# Patient Record
Sex: FEMALE | Race: WHITE
[De-identification: names, ages, dates, MRNs, and addresses within clinical notes are randomized per-mention and may not be internally consistent; named-entity substitution may affect disease eponyms.]

---

## 2017-04-21 ENCOUNTER — HOSPITAL ENCOUNTER (INPATIENT)
Dept: HOSPITAL 53 - M ED | Age: 52
LOS: 12 days | Discharge: HOME | DRG: 751 | End: 2017-05-03
Admitting: PSYCHIATRY & NEUROLOGY
Payer: COMMERCIAL

## 2017-04-21 VITALS — HEIGHT: 70 IN | BODY MASS INDEX: 31.59 KG/M2 | WEIGHT: 220.68 LBS

## 2017-04-21 DIAGNOSIS — H54.8: ICD-10-CM

## 2017-04-21 DIAGNOSIS — F43.10: ICD-10-CM

## 2017-04-21 DIAGNOSIS — B39.9: ICD-10-CM

## 2017-04-21 DIAGNOSIS — G43.909: ICD-10-CM

## 2017-04-21 DIAGNOSIS — F33.2: Primary | ICD-10-CM

## 2017-04-21 DIAGNOSIS — Z79.899: ICD-10-CM

## 2017-04-21 LAB
ALBUMIN SERPL BCG-MCNC: 3.1 GM/DL (ref 3.2–5.2)
ALBUMIN/GLOB SERPL: 0.76 {RATIO} (ref 1–1.93)
ALP SERPL-CCNC: 104 U/L (ref 45–117)
ALT SERPL W P-5'-P-CCNC: 11 U/L (ref 12–78)
ANION GAP SERPL CALC-SCNC: 6 MEQ/L (ref 8–16)
AST SERPL-CCNC: 9 U/L (ref 15–37)
BILIRUB CONJ SERPL-MCNC: < 0.1 MG/DL (ref 0–0.2)
BILIRUB SERPL-MCNC: 0.3 MG/DL (ref 0.2–1)
BUN SERPL-MCNC: 11 MG/DL (ref 7–18)
CALCIUM SERPL-MCNC: 8.3 MG/DL (ref 8.5–10.1)
CHLORIDE SERPL-SCNC: 107 MEQ/L (ref 98–107)
CO2 SERPL-SCNC: 25 MEQ/L (ref 21–32)
CREAT SERPL-MCNC: 0.84 MG/DL (ref 0.55–1.02)
ERYTHROCYTE [DISTWIDTH] IN BLOOD BY AUTOMATED COUNT: 15.9 % (ref 11.5–14.5)
GFR SERPL CREATININE-BSD FRML MDRD: > 60 ML/MIN/{1.73_M2} (ref 51–?)
GLUCOSE SERPL-MCNC: 97 MG/DL (ref 70–105)
MCH RBC QN AUTO: 25.3 PG (ref 27–33)
MCHC RBC AUTO-ENTMCNC: 32 G/DL (ref 32–36.5)
MCV RBC AUTO: 78.9 FL (ref 80–96)
METHADONE UR QL SCN: NEGATIVE
PLATELET # BLD AUTO: 252 K/MM3 (ref 150–450)
POTASSIUM SERPL-SCNC: 4.1 MEQ/L (ref 3.5–5.1)
PROT SERPL-MCNC: 7.2 GM/DL (ref 6.4–8.2)
SODIUM SERPL-SCNC: 138 MEQ/L (ref 136–145)
WBC # BLD AUTO: 8.5 K/MM3 (ref 4–10)

## 2017-04-22 VITALS — SYSTOLIC BLOOD PRESSURE: 113 MMHG | DIASTOLIC BLOOD PRESSURE: 56 MMHG

## 2017-04-22 VITALS — DIASTOLIC BLOOD PRESSURE: 61 MMHG | SYSTOLIC BLOOD PRESSURE: 109 MMHG

## 2017-04-22 VITALS — SYSTOLIC BLOOD PRESSURE: 132 MMHG | DIASTOLIC BLOOD PRESSURE: 70 MMHG

## 2017-04-22 RX ADMIN — SERTRALINE HYDROCHLORIDE SCH MG: 25 TABLET ORAL at 09:58
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
## 2017-04-23 VITALS — SYSTOLIC BLOOD PRESSURE: 118 MMHG | DIASTOLIC BLOOD PRESSURE: 59 MMHG

## 2017-04-23 VITALS — DIASTOLIC BLOOD PRESSURE: 72 MMHG | SYSTOLIC BLOOD PRESSURE: 124 MMHG

## 2017-04-23 VITALS — SYSTOLIC BLOOD PRESSURE: 132 MMHG | DIASTOLIC BLOOD PRESSURE: 66 MMHG

## 2017-04-23 RX ADMIN — SERTRALINE HYDROCHLORIDE SCH MG: 25 TABLET ORAL at 08:23

## 2017-04-23 NOTE — HPE
DATE OF ADMISSION:  04/21/2017

 

Please refer to the psychiatric history and evaluation for further details on

this admission.  This examination and history is intended for medical issues

which may need treatment, followup, or consult on this 52-year-old female.

 

ALLERGIES:  No known allergies.

 

PRIMARY CARE PROVIDER:  She sees a Dr. Haynes at the [facility].

 

SOCIAL HISTORY:  She is .  She does not drink alcohol.  She does not

smoke cigarettes.  She does not use recreational drugs.

 

PAST MEDICAL HISTORY:

1.  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

2.  She has history of depression.

3.  She has history of migraines.

 

PAST SURGICAL HISTORY:

1.  Left knee replacement.

2.  Tubal ligation.

3.  She has had laser treatments on her eyes.

 

LABORATORY STUDIES:

WBC 8.5, hemoglobin 11.9, hematocrit 37.1, MCV 78.9, MCH 25.3, platelets 252.

Toxicology negative.  Electrolytes normal.  BUN and creatinine:  BUN was 11,

creatinine 0.84.  Calcium 8.3.  TSH was slightly elevated at 4.530.

 

HOME MEDICATIONS:

- lorazepam 1 mg by mouth three times a day as needed for anxiety

- losartan 50 mg by mouth daily

- Paxil 37.5 mg by mouth daily

- Maxalt 10 mg by mouth as needed for headache

 

FAMILY HISTORY:  Noncontributory.

 

REVIEW OF SYSTEMS:

Ten systems review was done and was essentially unremarkable.  She is blind, uses

a walking blind stick.  She has a history of migraines, currently stable with

Maxalt, has not had to use it recently.  Otherwise review of systems was

unremarkable.

 

PHYSICAL EXAMINATION:

52-year-old cooperative female in no acute distress.  Height 70 inches, weight

100 kg, body mass index (BMI) 31.6.  Blood pressure 132/66, pulse 82,

respirations 20, temperature 98.

Patient is alert and oriented times three.

Pupils equal and reactive to light.  Extraocular movements are intact.  Cornea

and sclerae clear.  Conjunctivae is normal.  No facial asymmetry.  Pharynx,

tongue, gums pink and moist.  Tongue is midline.

NECK:  Supple without lymphadenopathy.  No thyromegaly.  No goiter.  Carotids 2+

without bruit.

CHEST:  Clear to auscultation without wheeze or retraction.

HEART:  Regular.

ABDOMEN:  Benign.  Bowel sounds positive.

GENITOURINARY/RECTAL:  Not done.

EXTREMITIES:  Show equal strength.  Full range of motion.  No cyanosis, clubbing,

or edema.  Peripheral pulses equal and palpable bilaterally.

SKIN:  Warm and dry.

 

IMPRESSION/PLAN:

1.  Psychiatric plan per psychiatry.

 

2.  History of blindness, secondary to histoplasmosis.

 

3.  History of migraines, clinically stable.

 

4.  Elevated thyroid stimulating hormone (TSH).  Will check a thyroid profile in

the morning.

 

5.  Slightly low mean corpuscular volume (MCV) and hemoglobin and hematocrit.

Will check a serum iron, iron binding capacity, and ferritin.
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
## 2017-04-24 VITALS — SYSTOLIC BLOOD PRESSURE: 127 MMHG | DIASTOLIC BLOOD PRESSURE: 59 MMHG

## 2017-04-24 VITALS — SYSTOLIC BLOOD PRESSURE: 127 MMHG | DIASTOLIC BLOOD PRESSURE: 82 MMHG

## 2017-04-24 VITALS — SYSTOLIC BLOOD PRESSURE: 111 MMHG | DIASTOLIC BLOOD PRESSURE: 52 MMHG

## 2017-04-24 VITALS — DIASTOLIC BLOOD PRESSURE: 70 MMHG | SYSTOLIC BLOOD PRESSURE: 134 MMHG

## 2017-04-24 LAB
FERRITIN SERPL-MCNC: 38 NG/ML (ref 8–252)
IRON SATN MFR SERPL: 13.8 % (ref 13.2–37.4)
T3RU NFR SERPL: 31 % (ref 30–39)
T4 SERPL-MCNC: 7.9 UG/DL (ref 4.5–12)
TIBC SERPL-MCNC: 247 UG/DL (ref 250–450)

## 2017-04-24 RX ADMIN — SERTRALINE HYDROCHLORIDE SCH MG: 25 TABLET ORAL at 08:32
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
## 2017-04-25 VITALS — SYSTOLIC BLOOD PRESSURE: 115 MMHG | DIASTOLIC BLOOD PRESSURE: 55 MMHG

## 2017-04-25 VITALS — DIASTOLIC BLOOD PRESSURE: 57 MMHG | SYSTOLIC BLOOD PRESSURE: 105 MMHG

## 2017-04-25 RX ADMIN — SERTRALINE HYDROCHLORIDE SCH MG: 25 TABLET ORAL at 08:32

## 2017-04-25 NOTE — IPNPDOC
[facility] Progress Note


Progress Note


DATE OF SERVICE: 4/25/17





HISTORY: Day 4 of admission. Pt remains + for suicidal ideation.





VITAL SIGNS: See below.





NEW TEST RESULTS: none





CURRENT MEDICATIONS: See below.





MENTAL STATUS EXAMINATION:


Patient is a 52-year old female, who is returning for treatment of depression.


Language skills are good.


Thought processes : goal directed, logical


Thought content: admits to continuing SI with thoughts of not deserving to live 

and no reason to go on. Abstract reasoning, and computation:good . Description 

of associations: good.


Description of abnormal or psychotic thoughts: as above, pt continues to doubt 

her reason for living and her purpose in life. Dwells on the past and deeply 

affected by her trauma. No psychotic process observed.


Judgment: good


Insight: good, 


Orientation: A & O x 3


Recent and remote memory: grossly intact


Attention span and concentration: good.


Fund of knowledge: Full


Mood: dysphoric. Affect: flat.





DIAGNOSES:


1. PTSD


2. MDD





 


ASSESSMENT: pt engaged easily in discussion. Still having periodic suicidal 

thoughts. Says she would use any means she could find to harm herself if 

discharged. This includes using a bed sheet, deliberately falling on the floor 

and hitting her head or overdosing on medication. she states her son will be in 

charge of her medication when she is finally discharged. Pt states she will ask 

staff for help on the unit if her thoughts of suicide become overpowering.





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 

and macular degeneration. She used to be able to listen to books on tape as she 

loves to read. She has lost interest in this along with her craft activities 

that include knitting. 


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 She went from earnig $63,000.00 a year to nothing after she became blind. 

She will be eligible for a pension at age 56.





MANAGEMENT PLAN: continue meds, observe for resolving depression and reduction 

in anxiety. Pt was started on buspar today. BNZ were lowered due to risk of 

addiction and memory issues. Pt is attending 2-3 groups a day. She attended 

Team meeting this morning. She is cordial with others. She states that her 

blindness and the ensuing loss of independence has been very difficult for her. 

Support and encouragement rendered.





TIME SPENT: 25 minutes.





Vital Signs





 Vital Signs








  Date Time  Temp Pulse Resp B/P Pulse Ox O2 Delivery O2 Flow Rate FiO2


 


4/25/17 06:25 98.7 73 16 115/55    


 


4/21/17 23:53     96 Room Air  











Current Medications





 Current Medications


Acetaminophen (Tylenol Tab) 650 mg Q6HP  PRN PO HEADACHE or DISCOMFORT;  Start 4 /21/17 at 23:15;  Stop 5/21/17 at 23:14


Al Hydrox/Mg Hydrox/Simethicone (Mylanta) 30 ml Q4HP  PRN PO HEARTBURN/

INDIGESTION;  Start 4/21/17 at 23:15;  Stop 5/21/17 at 23:14


Buspirone HCl (Buspar) 10 mg TID PO  Last administered on 4/25/17at 15:36;  

Start 4/24/17 at 21:00;  Stop 5/24/17 at 20:59


Home Med (Med Rec Complete!)  ASDIRECTED XX ;  Start 4/21/17 at 23:45;  Stop 4/ 21/17 at 23:50;  Status DC


Hydroxyzine HCl (Atarax) 25 mg BID PO  Last administered on 4/25/17at 08:30;  

Start 4/22/17 at 09:00;  Stop 5/22/17 at 08:59


Lorazepam (Ativan) 1 mg BID PO  Last administered on 4/25/17at 08:32;  Start 4/ 24/17 at 21:00;  Stop 5/1/17 at 20:59


Lorazepam (Ativan) 1 mg BIDP  PRN PO ANXIETY/AGITATION Last administered on 4/22 /17at 20:53;  Start 4/21/17 at 23:15;  Stop 4/23/17 at 14:45;  Status DC


Lorazepam (Ativan) 2 mg BID  PRN PO ANXIETY/AGITATION Last administered on 4/24/ 17at 14:40;  Start 4/23/17 at 15:00;  Stop 4/24/17 at 16:32;  Status DC


Magnesium Hydroxide (Milk Of Magnesia) 30 ml DAILYPRN  PRN PO CONSTIPATION;  

Start 4/21/17 at 23:15;  Stop 5/21/17 at 23:14


Sertraline HCl (Zoloft) 50 mg QAM PO ;  Start 4/22/17 at 09:00;  Stop 5/22/17 

at 08:59;  Status Cancel


Sertraline HCl (Zoloft) 75 mg DAILY PO  Last administered on 4/25/17at 08:32;  

Start 4/22/17 at 09:00;  Stop 5/22/17 at 08:59


Trazodone HCl (Desyrel) 50 mg QHSP  PRN PO INSOMNIA Last administered on 4/24/ 17at 20:09;  Start 4/21/17 at 23:15;  Stop 5/21/17 at 23:14





Allergies


Coded Allergies:  


     No Known Allergies (Unverified , 4/21/17)








Racheal Sampson Apr 25, 2017 17:30

## 2017-04-26 VITALS — SYSTOLIC BLOOD PRESSURE: 107 MMHG | DIASTOLIC BLOOD PRESSURE: 58 MMHG

## 2017-04-26 VITALS — DIASTOLIC BLOOD PRESSURE: 54 MMHG | SYSTOLIC BLOOD PRESSURE: 105 MMHG

## 2017-04-26 VITALS — SYSTOLIC BLOOD PRESSURE: 110 MMHG | DIASTOLIC BLOOD PRESSURE: 60 MMHG

## 2017-04-26 RX ADMIN — SERTRALINE HYDROCHLORIDE SCH MG: 25 TABLET ORAL at 08:10
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
## 2017-04-27 VITALS — DIASTOLIC BLOOD PRESSURE: 65 MMHG | SYSTOLIC BLOOD PRESSURE: 131 MMHG

## 2017-04-27 VITALS — DIASTOLIC BLOOD PRESSURE: 53 MMHG | SYSTOLIC BLOOD PRESSURE: 106 MMHG

## 2017-04-27 RX ADMIN — SERTRALINE HYDROCHLORIDE SCH MG: 25 TABLET ORAL at 08:12

## 2017-04-27 NOTE — IPNPDOC
[facility] Progress Note


Progress Note


DATE OF SERVICE: 4/27/17





HISTORY: Day 6 of admission. Pt reports improvement in depressive mood. Lack of 

suicidal thinking for 24 hours.





VITAL SIGNS: See below.





NEW TEST RESULTS: na





CURRENT MEDICATIONS: See below.





MENTAL STATUS EXAMINATION:


Patient is a 52-year old female, who is wearing dark glasses, dressed in 

hospital garb with a sweater and using her cane.


Speech: Is spontaneous and clear


Language skills are good


Thought processes :logical and goal directed.


Thought content: improved mood, lack of suicidal thoughts, discharge, plans 

when she returns home to improve coping. Abstract reasoning, and computation: 

good. Description of associations: good.


Description of abnormal or psychotic thoughts: Pt reports that over the last 24 

hours she has not had a thought of suicide.


Judgment: good


Insight: good.


Orientation: A & O x 3


Recent and remote memory: grossly intact


Attention span and concentration: good.


Fund of knowledge: Full


Mood: euthymic. Affect: congruent.





DIAGNOSES:


1. PTSD, chronic


2. MDD, severe, recurrent





 


ASSESSMENT: Ms. Self verbalizes improved mood over this past week. She has 

even discussed a possible discharge date with her son.  She has written out a 

safety plan and coping skills she plans to use to help her remain safe at home. 

Pt verbalizes improved anxiety. she is not as consumed by past events as she 

was when she first arrived. She has along way to go with healing from her 

traumas but she is making progress and is a willing participant in her recovery.





MANAGEMENT PLAN: Med adjustment is ongoing to improve mood and reduce reliance 

on Ativan. Pt is learning positive coping skills of talk therapy, journaling 

and socializing. Upon leaving the hospital she will be scheduled for med mgt 

and therapy in [facility]. She will live with her son who works all day. She has 

2 good friends she can count on for support. She enjoys knitting and 

crocheting. Her son got her a new I-phone with expanded memory so she can 

download and listen to books and music. She may consider volunteer work at some 

point in time.





We are looking at a possible discharge next week of Tuesday or Wednesday. Her 

son may be busy but a friend may be able to take her home as she lives in 

[facility].





TIME SPENT: 50 minutes.





Vital Signs





Vital Signs








  Date Time  Temp Pulse Resp B/P (MAP) Pulse Ox O2 Delivery O2 Flow Rate FiO2


 


4/27/17 06:25 99.3 84 20 106/53 (70)    


 


4/26/17 18:00      Room Air  


 


4/21/17 23:53     96   











Current Medications





Current Medications


Acetaminophen (Tylenol Tab) 650 mg Q6HP  PRN PO HEADACHE or DISCOMFORT;  Start 4 /21/17 at 23:15;  Stop 5/21/17 at 23:14


Al Hydrox/Mg Hydrox/Simethicone (Mylanta) 30 ml Q4HP  PRN PO HEARTBURN/

INDIGESTION;  Start 4/21/17 at 23:15;  Stop 5/21/17 at 23:14


Buspirone HCl (Buspar) 10 mg TID PO  Last administered on 4/27/17at 08:12;  

Start 4/24/17 at 21:00;  Stop 5/24/17 at 20:59


Home Med (Med Rec Complete!)  ASDIRECTED XX ;  Start 4/21/17 at 23:45;  Stop 4/ 21/17 at 23:50;  Status DC


Hydroxyzine HCl (Atarax) 25 mg BID PO  Last administered on 4/27/17at 08:12;  

Start 4/22/17 at 09:00;  Stop 5/22/17 at 08:59


Lorazepam (Ativan) 1 mg BID PO  Last administered on 4/27/17at 08:13;  Start 4/ 24/17 at 21:00;  Stop 5/1/17 at 20:59


Lorazepam (Ativan) 1 mg BIDP  PRN PO ANXIETY/AGITATION Last administered on 4/22 /17at 20:53;  Start 4/21/17 at 23:15;  Stop 4/23/17 at 14:45;  Status DC


Lorazepam (Ativan) 2 mg BID  PRN PO ANXIETY/AGITATION Last administered on 4/24/ 17at 14:40;  Start 4/23/17 at 15:00;  Stop 4/24/17 at 16:32;  Status DC


Lorazepam (Ativan) 2 mg STAT  STAT PO  Last administered on 4/23/17at 14:54;  

Start 4/23/17 at 14:45;  Stop 4/23/17 at 14:47;  Status DC


Magnesium Hydroxide (Milk Of Magnesia) 30 ml DAILYPRN  PRN PO CONSTIPATION;  

Start 4/21/17 at 23:15;  Stop 5/21/17 at 23:14


Sertraline HCl (Zoloft) 50 mg QAM PO ;  Start 4/22/17 at 09:00;  Stop 5/22/17 

at 08:59;  Status Cancel


Sertraline HCl (Zoloft) 75 mg DAILY PO  Last administered on 4/27/17at 08:12;  

Start 4/22/17 at 09:00;  Stop 5/22/17 at 08:59


Trazodone HCl (Desyrel) 50 mg QHSP  PRN PO INSOMNIA Last administered on 4/26/ 17at 20:03;  Start 4/21/17 at 23:15;  Stop 5/21/17 at 23:14





Allergies


Coded Allergies:  


     No Known Allergies (Unverified , 4/21/17)











Racheal Sampson Apr 27, 2017 14:18

## 2017-04-28 VITALS — SYSTOLIC BLOOD PRESSURE: 99 MMHG | DIASTOLIC BLOOD PRESSURE: 54 MMHG

## 2017-04-28 VITALS — DIASTOLIC BLOOD PRESSURE: 53 MMHG | SYSTOLIC BLOOD PRESSURE: 103 MMHG

## 2017-04-28 VITALS — DIASTOLIC BLOOD PRESSURE: 52 MMHG | SYSTOLIC BLOOD PRESSURE: 113 MMHG

## 2017-04-28 RX ADMIN — SERTRALINE HYDROCHLORIDE SCH MG: 100 TABLET ORAL at 08:08

## 2017-04-28 NOTE — IPNPDOC
[facility] Progress Note


Progress Note


DATE OF SERVICE: 4/28/17





HISTORY: Day 7. Very bad day for patient. Suicidal thinking and depression has 

returned.





VITAL SIGNS: See below.





NEW TEST RESULTS: 





CURRENT MEDICATIONS: See below.





MENTAL STATUS EXAMINATION:


Patient is a 52-year old female, who is wearing dark glasses, crying, dressed 

in hospital attire with walking cane.


Speech: Is interrupted by crying


Language skills are good.


Thought processes including: suicidal thinking and hopelessness


Thought content: "I have to get better". Abstract reasoning, and computation: 

poor. Description of associations: poor


Description of abnormal or psychotic thoughts: "I don't deserve to live any 

more. There is really nothing to live for".


Judgment: poor


Insight: very limited


Orientation: not oriented to time. Is oriented to person and place.


Recent and remote memory: intact


Attention span and concentration: poor, cannot concentrate


Fund of knowledge: full


Mood: depressed. Affect: crying, labile, anxious.





DIAGNOSES:


1. PTSD


2. Major Depressive Disorder, severe, recurrent


3. Axis II


 


ASSESSMENT:Pt had a visit by her son Eber last night. He is not able to 

visit daily and wants to make sure she has someone that does come in to see her 

every day. Asked her to get in touch with her 2 friends to see if they can 

visit some nights as he is busy due to work. Pt denies this is the reason she 

is upset today. She was much more calm yesterday. She is shaking and using prn 

anxiety meds. Meds are available to her. Suggested she not attend groups right 

now until she is more composed. She seems to think she must attend groups and 

this may be a misunderstanding on her part. Explained that if she is  not 

emotionally able to handle discussions today she should not attend. Pt has been 

working hard on her triggers and did not sleep well last night. She may be 

stressing herself beyond her capacities. Encouraged her to ease off and try to 

remain present in the here and now for the next few days. 





MANAGEMENT PLAN: Pt needs to sign GOYO's for other visitors, Will not lower 

ativan as previously planned due to exacerbation of anxiety today/last night. 

Continue to offer caring and support. Will increase buspirone to 15 mg tid. Pt 

reassessed for SI in the afternoon and thoughts are there but not as frequently 

as last night or this morning. We may be seeing some Axis II behavior given the 

fast turn around of pts symptoms. She did attend one group today. Will continue 

to monitor.





TIME SPENT: 40 minutes.





Vital Signs





Vital Signs








  Date Time  Temp Pulse Resp B/P (MAP) Pulse Ox O2 Delivery O2 Flow Rate FiO2


 


4/28/17 06:50 99.1 73 18 103/53 (70)    


 


4/27/17 18:00     96 Room Air  











Current Medications





Current Medications


Acetaminophen (Tylenol Tab) 650 mg Q6HP  PRN PO HEADACHE or DISCOMFORT;  Start 4 /21/17 at 23:15;  Stop 5/21/17 at 23:14


Al Hydrox/Mg Hydrox/Simethicone (Mylanta) 30 ml Q4HP  PRN PO HEARTBURN/

INDIGESTION;  Start 4/21/17 at 23:15;  Stop 5/21/17 at 23:14


Buspirone HCl (Buspar) 10 mg TID PO  Last administered on 4/28/17at 08:08;  

Start 4/24/17 at 21:00;  Stop 5/24/17 at 20:59


Home Med (Med Rec Complete!)  ASDIRECTED XX ;  Start 4/21/17 at 23:45;  Stop 4/ 21/17 at 23:50;  Status DC


Hydroxyzine HCl (Atarax) 25 mg BID PO  Last administered on 4/28/17at 08:08;  

Start 4/22/17 at 09:00;  Stop 4/28/17 at 09:05;  Status DC


Hydroxyzine HCl (Atarax) 25 mg Q6HP  PRN PO anxiety;  Start 4/29/17 at 09:00;  

Stop 5/22/17 at 08:59


Lorazepam (Ativan) 1 mg BID PO  Last administered on 4/28/17at 08:09;  Start 4/ 24/17 at 21:00;  Stop 5/1/17 at 20:59


Lorazepam (Ativan) 1 mg BIDP  PRN PO ANXIETY/AGITATION Last administered on 4/22 /17at 20:53;  Start 4/21/17 at 23:15;  Stop 4/23/17 at 14:45;  Status DC


Lorazepam (Ativan) 1 mg DAILYPRN  PRN PO anxiety;  Start 4/28/17 at 09:15;  

Stop 5/5/17 at 09:14


Lorazepam (Ativan) 2 mg BID  PRN PO ANXIETY/AGITATION Last administered on 4/24/ 17at 14:40;  Start 4/23/17 at 15:00;  Stop 4/24/17 at 16:32;  Status DC


Lorazepam (Ativan) 2 mg STAT  STAT PO  Last administered on 4/23/17at 14:54;  

Start 4/23/17 at 14:45;  Stop 4/23/17 at 14:47;  Status DC


Magnesium Hydroxide (Milk Of Magnesia) 30 ml DAILYPRN  PRN PO CONSTIPATION;  

Start 4/21/17 at 23:15;  Stop 5/21/17 at 23:14


Sertraline HCl (Zoloft) 50 mg QAM PO ;  Start 4/22/17 at 09:00;  Stop 5/22/17 

at 08:59;  Status Cancel


Sertraline HCl (Zoloft) 75 mg DAILY PO  Last administered on 4/27/17at 08:12;  

Start 4/22/17 at 09:00;  Stop 4/27/17 at 14:39;  Status DC


Sertraline HCl (Zoloft) 100 mg DAILY PO  Last administered on 4/28/17at 08:08;  

Start 4/28/17 at 09:00;  Stop 5/28/17 at 08:59


Trazodone HCl (Desyrel) 50 mg QHSP  PRN PO INSOMNIA Last administered on 4/27/ 17at 20:20;  Start 4/21/17 at 23:15;  Stop 5/21/17 at 23:14





Allergies


Coded Allergies:  


     No Known Allergies (Unverified , 4/21/17)











Racheal Sampson Apr 28, 2017 09:50

## 2017-04-29 VITALS — SYSTOLIC BLOOD PRESSURE: 130 MMHG | DIASTOLIC BLOOD PRESSURE: 62 MMHG

## 2017-04-29 VITALS — SYSTOLIC BLOOD PRESSURE: 120 MMHG | DIASTOLIC BLOOD PRESSURE: 58 MMHG

## 2017-04-29 VITALS — SYSTOLIC BLOOD PRESSURE: 148 MMHG | DIASTOLIC BLOOD PRESSURE: 88 MMHG

## 2017-04-29 VITALS — SYSTOLIC BLOOD PRESSURE: 98 MMHG | DIASTOLIC BLOOD PRESSURE: 51 MMHG

## 2017-04-29 RX ADMIN — ACETAMINOPHEN PRN MG: 325 TABLET ORAL at 17:57

## 2017-04-29 RX ADMIN — SERTRALINE HYDROCHLORIDE SCH MG: 100 TABLET ORAL at 08:17

## 2017-04-30 VITALS — DIASTOLIC BLOOD PRESSURE: 71 MMHG | SYSTOLIC BLOOD PRESSURE: 162 MMHG

## 2017-04-30 VITALS — SYSTOLIC BLOOD PRESSURE: 146 MMHG | DIASTOLIC BLOOD PRESSURE: 68 MMHG

## 2017-04-30 RX ADMIN — ACETAMINOPHEN PRN MG: 325 TABLET ORAL at 17:03

## 2017-04-30 RX ADMIN — SERTRALINE HYDROCHLORIDE SCH MG: 100 TABLET ORAL at 08:08

## 2017-05-01 VITALS — SYSTOLIC BLOOD PRESSURE: 135 MMHG | DIASTOLIC BLOOD PRESSURE: 70 MMHG

## 2017-05-01 VITALS — SYSTOLIC BLOOD PRESSURE: 131 MMHG | DIASTOLIC BLOOD PRESSURE: 79 MMHG

## 2017-05-01 RX ADMIN — ACETAMINOPHEN PRN MG: 325 TABLET ORAL at 17:57

## 2017-05-01 RX ADMIN — SERTRALINE HYDROCHLORIDE SCH MG: 100 TABLET ORAL at 08:14

## 2017-05-01 NOTE — IPNPDOC
[facility] Progress Note


Progress Note


DATE OF SERVICE: 5/1/17





HISTORY: Day 10 of admission. Pt continues to regress.





VITAL SIGNS: See below.





NEW TEST RESULTS: 





CURRENT MEDICATIONS: See below.





MENTAL STATUS EXAMINATION:


Patient is a 52 year old female, who is blind and wearing dark glasses, dressed 

in hospital attire and a sweater, using white cane. Hygiene is good.


Speech: Is spontaneous.


Language skills are good.


Thought processes including: perseverating about the past, some fleeting 

thoughts of suicide but not to the degree they were on admision.


Thought content: very upset with unit milieu. Abstract reasoning, and 

computation:poor. Description of associations: good.


Description of abnormal or psychotic thoughts: no suicidal or homicidal 

ideation Pt denies voices or visions. Feels "unsafe on the unit" due to the 

behavior of select peers that are agitated, argumentative and combative.


Judgment: fair


Insight:  fair


Orientation: A & O x 3


Recent and remote memory: good


Attention span and concentration: distracted due to worry about safety 


Fund of knowledge: good


Mood: depressed. Affect: constricted.





DIAGNOSES:


1. PTSD, chronic


2. MDD


3. 


 


ASSESSMENT:Pt has an increase in buspar and sertraline on Friday. She has 

standing Ativan 1 mg bid and a prn ativan daily. She has been utilizing the prn 

daily. She was reminded she has a hydroxyzine order as well and she can access 

that med 3 x a day. She says the yelling and fighting on the unit has caused 

her an unusually high level of anxiety. Her blindness makes her feel very 

vulnerable when voices are raised and threats are made.  Reassurances regarding 

her safety were given. Stressed that she may go to staff if she is having a 

very hard time. Encouraged use of progressive relaxation and mediation to help 

her calm down. Sleep is usually good but she had some difficulty over the 

weekend in this area too.





Pt was able to tolerate several groups today. She seemed much more calm in the 

afternoon and said she felt less anxious. She asked if discharge might be 

possible this week. She will talk to her son about leaving Wed or Thursday.





MANAGEMENT PLAN: SSRI needs additional time to become fully effective, same 

with buspirone. Encouraged her to look for small positive signs of improvement. 

Continue involvement with groups as tolerated. Pt may have worked to hard and 

too quickly initially and now she feels she can't function very well. Pt is 

labile in conversation with crying and or tearfulness during discussion. Asks 

for discharge later in alex month around the 11th of May. We will take it a day 

at a time as she may not need to remain all that time.





TIME SPENT: 35 minutes.





Vital Signs





Vital Signs








  Date Time  Temp Pulse Resp B/P (MAP) Pulse Ox O2 Delivery O2 Flow Rate FiO2


 


5/1/17 06:31 98.1 87 20 135/70 (91)    


 


4/27/17 18:00     96 Room Air  











Current Medications





Current Medications


Acetaminophen (Tylenol Tab) 650 mg Q6HP  PRN PO HEADACHE or DISCOMFORT Last 

administered on 4/30/17at 17:03;  Start 4/21/17 at 23:15;  Stop 5/21/17 at 23:14


Al Hydrox/Mg Hydrox/Simethicone (Mylanta) 30 ml Q4HP  PRN PO HEARTBURN/

INDIGESTION;  Start 4/21/17 at 23:15;  Stop 5/21/17 at 23:14


Buspirone HCl (Buspar) 10 mg TID PO  Last administered on 4/28/17at 08:08;  

Start 4/24/17 at 21:00;  Stop 4/28/17 at 14:51;  Status DC


Buspirone HCl (Buspar) 15 mg TID PO  Last administered on 5/1/17at 08:14;  

Start 4/28/17 at 16:00;  Stop 5/28/17 at 15:59


Home Med (Med Rec Complete!)  ASDIRECTED XX ;  Start 4/21/17 at 23:45;  Stop 4/ 21/17 at 23:50;  Status DC


Hydroxyzine HCl (Atarax) 25 mg BID PO  Last administered on 4/28/17at 08:08;  

Start 4/22/17 at 09:00;  Stop 4/28/17 at 09:05;  Status DC


Hydroxyzine HCl (Atarax) 25 mg Q6HP  PRN PO anxiety;  Start 4/29/17 at 09:00;  

Stop 5/22/17 at 08:59


Lorazepam (Ativan) 1 mg BID PO  Last administered on 5/1/17at 08:14;  Start 4/24 /17 at 21:00;  Stop 5/7/17 at 20:59


Lorazepam (Ativan) 1 mg BIDP  PRN PO ANXIETY/AGITATION Last administered on 4/22 /17at 20:53;  Start 4/21/17 at 23:15;  Stop 4/23/17 at 14:45;  Status DC


Lorazepam (Ativan) 1 mg DAILYPRN  PRN PO anxiety Last administered on 4/30/17at 

09:19;  Start 4/28/17 at 09:15;  Stop 5/5/17 at 09:14


Lorazepam (Ativan) 1 mg STAT  STAT PO  Last administered on 4/30/17at 15:38;  

Start 4/30/17 at 15:34;  Stop 4/30/17 at 15:36;  Status DC


Lorazepam (Ativan) 2 mg BID  PRN PO ANXIETY/AGITATION Last administered on 4/24/ 17at 14:40;  Start 4/23/17 at 15:00;  Stop 4/24/17 at 16:32;  Status DC


Lorazepam (Ativan) 2 mg STAT  STAT PO  Last administered on 4/23/17at 14:54;  

Start 4/23/17 at 14:45;  Stop 4/23/17 at 14:47;  Status DC


Magnesium Hydroxide (Milk Of Magnesia) 30 ml DAILYPRN  PRN PO CONSTIPATION;  

Start 4/21/17 at 23:15;  Stop 5/21/17 at 23:14


Sertraline HCl (Zoloft) 50 mg QAM PO ;  Start 4/22/17 at 09:00;  Stop 5/22/17 

at 08:59;  Status Cancel


Sertraline HCl (Zoloft) 75 mg DAILY PO  Last administered on 4/27/17at 08:12;  

Start 4/22/17 at 09:00;  Stop 4/27/17 at 14:39;  Status DC


Sertraline HCl (Zoloft) 100 mg DAILY PO  Last administered on 5/1/17at 08:14;  

Start 4/28/17 at 09:00;  Stop 5/28/17 at 08:59


Trazodone HCl (Desyrel) 50 mg QHSP  PRN PO INSOMNIA Last administered on 4/30/ 17at 21:22;  Start 4/21/17 at 23:15;  Stop 5/21/17 at 23:14





Allergies


Coded Allergies:  


     No Known Allergies (Unverified , 4/21/17)











Racheal Sampson 1, 2017 14:17

## 2017-05-02 VITALS — SYSTOLIC BLOOD PRESSURE: 144 MMHG | DIASTOLIC BLOOD PRESSURE: 96 MMHG

## 2017-05-02 VITALS — DIASTOLIC BLOOD PRESSURE: 58 MMHG | SYSTOLIC BLOOD PRESSURE: 121 MMHG

## 2017-05-02 RX ADMIN — SERTRALINE HYDROCHLORIDE SCH MG: 100 TABLET ORAL at 08:47

## 2017-05-02 NOTE — IPNPDOC
[facility] Progress Note


Progress Note


DATE OF SERVICE: 5/2/17





HISTORY: Day 11 of admission. Pt is requesting discharge due to chaos on the 

unit.





VITAL SIGNS: See below.





NEW TEST RESULTS: na





CURRENT MEDICATIONS: See below.





MENTAL STATUS EXAMINATION:


Patient is a 52-year old female, who is fearful she is not safe on the unit due 

to blindness and the acuity of psychotic patients. She is wearing hospital pj's 

and a sweater, hygiene is good.


Speech: Is clear, spontaneous


Language skills are intact


Thought processes including: goal directed


Thought content: appropriate. Abstract reasoning, and computation: adequate 

Description of associations: adequate


Description of abnormal or psychotic thoughts: no psychotic thought processes 

illicited. Denies SI.Denies HI


Judgment: good.


Insight: good.


Orientation: well oriented in all spheres.


Recent and remote memory: intact


Attention span and concentration: good


Fund of knowledge: full


Mood: anxious. Affect: anxious





DIAGNOSES:


1. PTSD


2. MDD, severe, chronic


3. Blindness


4. r/o axis II


 


ASSESSMENT: Pt is very much affected by the level of aggression and noise on 

the unit. The yelling by other patients and the punching by pts on staff has 

caused her to want to leave sooner than she originally intended. She is ready 

for discharge as much of her concerns can be adequately addressed in outpatient 

therapy. Pt has arranged for a friend to pick her up from the hospital once 

discharge is authorized. She has done well on her medications and has grown a 

great deal in the area of insight.





MANAGEMENT PLAN: We are attempting contact with son with whom she will be 

living. Tentative discharge date is 5/3/17.





TIME SPENT: 30 minutes.





Vital Signs





Vital Signs








  Date Time  Temp Pulse Resp B/P (MAP) Pulse Ox O2 Delivery O2 Flow Rate FiO2


 


5/2/17 06:25 98.1 105 16 144/96 (112)    


 


4/27/17 18:00     96 Room Air  











Current Medications





Current Medications


Acetaminophen (Tylenol Tab) 650 mg Q6HP  PRN PO HEADACHE or DISCOMFORT Last 

administered on 5/1/17at 17:57;  Start 4/21/17 at 23:15;  Stop 5/21/17 at 23:14


Al Hydrox/Mg Hydrox/Simethicone (Mylanta) 30 ml Q4HP  PRN PO HEARTBURN/

INDIGESTION;  Start 4/21/17 at 23:15;  Stop 5/21/17 at 23:14


Buspirone HCl (Buspar) 10 mg TID PO  Last administered on 4/28/17at 08:08;  

Start 4/24/17 at 21:00;  Stop 4/28/17 at 14:51;  Status DC


Buspirone HCl (Buspar) 15 mg TID PO  Last administered on 5/2/17at 08:47;  

Start 4/28/17 at 16:00;  Stop 5/28/17 at 15:59


Home Med (Med Rec Complete!)  ASDIRECTED XX ;  Start 4/21/17 at 23:45;  Stop 4/ 21/17 at 23:50;  Status DC


Hydroxyzine HCl (Atarax) 25 mg BID PO  Last administered on 4/28/17at 08:08;  

Start 4/22/17 at 09:00;  Stop 4/28/17 at 09:05;  Status DC


Hydroxyzine HCl (Atarax) 25 mg Q6HP  PRN PO anxiety;  Start 4/29/17 at 09:00;  

Stop 5/22/17 at 08:59


Lorazepam (Ativan) 1 mg BID PO  Last administered on 5/2/17at 08:47;  Start 4/24 /17 at 21:00;  Stop 5/7/17 at 20:59


Lorazepam (Ativan) 1 mg BIDP  PRN PO ANXIETY/AGITATION Last administered on 4/22 /17at 20:53;  Start 4/21/17 at 23:15;  Stop 4/23/17 at 14:45;  Status DC


Lorazepam (Ativan) 1 mg DAILYPRN  PRN PO anxiety Last administered on 5/1/17at 

19:38;  Start 4/28/17 at 09:15;  Stop 5/5/17 at 09:14


Lorazepam (Ativan) 1 mg STAT  STAT PO  Last administered on 4/30/17at 15:38;  

Start 4/30/17 at 15:34;  Stop 4/30/17 at 15:36;  Status DC


Lorazepam (Ativan) 2 mg BID  PRN PO ANXIETY/AGITATION Last administered on 4/24/ 17at 14:40;  Start 4/23/17 at 15:00;  Stop 4/24/17 at 16:32;  Status DC


Lorazepam (Ativan) 2 mg STAT  STAT PO  Last administered on 4/23/17at 14:54;  

Start 4/23/17 at 14:45;  Stop 4/23/17 at 14:47;  Status DC


Magnesium Hydroxide (Milk Of Magnesia) 30 ml DAILYPRN  PRN PO CONSTIPATION;  

Start 4/21/17 at 23:15;  Stop 5/21/17 at 23:14


Sertraline HCl (Zoloft) 50 mg QAM PO ;  Start 4/22/17 at 09:00;  Stop 5/22/17 

at 08:59;  Status Cancel


Sertraline HCl (Zoloft) 75 mg DAILY PO  Last administered on 4/27/17at 08:12;  

Start 4/22/17 at 09:00;  Stop 4/27/17 at 14:39;  Status DC


Sertraline HCl (Zoloft) 100 mg DAILY PO  Last administered on 5/2/17at 08:47;  

Start 4/28/17 at 09:00;  Stop 5/28/17 at 08:59


Trazodone HCl (Desyrel) 50 mg QHSP  PRN PO INSOMNIA Last administered on 5/1/ 17at 20:34;  Start 4/21/17 at 23:15;  Stop 5/21/17 at 23:14





Allergies


Coded Allergies:  


     No Known Allergies (Unverified , 4/21/17)











Racheal Sampson May 2, 2017 15:14

## 2017-05-03 VITALS — DIASTOLIC BLOOD PRESSURE: 90 MMHG | SYSTOLIC BLOOD PRESSURE: 150 MMHG

## 2017-05-03 RX ADMIN — SERTRALINE HYDROCHLORIDE SCH MG: 100 TABLET ORAL at 08:03
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
## 2022-08-01 ENCOUNTER — HOSPITAL ENCOUNTER (OUTPATIENT)
Dept: HOSPITAL 53 - M LABSMTC | Age: 57
End: 2022-08-01
Attending: ANESTHESIOLOGY
Payer: MEDICARE

## 2022-08-01 DIAGNOSIS — Z01.818: Primary | ICD-10-CM

## 2022-08-01 DIAGNOSIS — Z11.52: ICD-10-CM

## 2022-08-02 ENCOUNTER — HOSPITAL ENCOUNTER (OUTPATIENT)
Dept: HOSPITAL 53 - M RAD | Age: 57
End: 2022-08-02
Attending: INTERNAL MEDICINE
Payer: MEDICARE

## 2022-08-02 DIAGNOSIS — Z01.810: Primary | ICD-10-CM

## 2022-08-03 ENCOUNTER — HOSPITAL ENCOUNTER (OUTPATIENT)
Dept: HOSPITAL 53 - M SDC | Age: 57
Discharge: HOME | End: 2022-08-03
Attending: INTERNAL MEDICINE
Payer: MEDICARE

## 2022-08-03 VITALS — DIASTOLIC BLOOD PRESSURE: 62 MMHG | SYSTOLIC BLOOD PRESSURE: 133 MMHG

## 2022-08-03 VITALS — BODY MASS INDEX: 27.69 KG/M2 | WEIGHT: 197.8 LBS | HEIGHT: 71 IN

## 2022-08-03 DIAGNOSIS — Z86.711: ICD-10-CM

## 2022-08-03 DIAGNOSIS — I08.0: ICD-10-CM

## 2022-08-03 DIAGNOSIS — H54.8: ICD-10-CM

## 2022-08-03 DIAGNOSIS — R55: Primary | ICD-10-CM

## 2022-08-03 DIAGNOSIS — Z79.899: ICD-10-CM

## 2022-08-03 DIAGNOSIS — I25.10: ICD-10-CM

## 2022-08-03 DIAGNOSIS — G43.909: ICD-10-CM

## 2022-08-03 DIAGNOSIS — D64.9: ICD-10-CM

## 2022-08-03 DIAGNOSIS — Z92.21: ICD-10-CM

## 2022-08-03 DIAGNOSIS — R94.31: ICD-10-CM

## 2022-08-03 DIAGNOSIS — Z86.73: ICD-10-CM

## 2022-08-03 PROCEDURE — 33285 INSJ SUBQ CAR RHYTHM MNTR: CPT
